# Patient Record
Sex: MALE | Race: WHITE | NOT HISPANIC OR LATINO | Employment: FULL TIME | URBAN - METROPOLITAN AREA
[De-identification: names, ages, dates, MRNs, and addresses within clinical notes are randomized per-mention and may not be internally consistent; named-entity substitution may affect disease eponyms.]

---

## 2018-04-05 ENCOUNTER — TRANSCRIBE ORDERS (OUTPATIENT)
Dept: ADMINISTRATIVE | Facility: HOSPITAL | Age: 63
End: 2018-04-05

## 2018-04-05 DIAGNOSIS — R91.1 LUNG NODULE: Primary | ICD-10-CM

## 2019-02-14 ENCOUNTER — OFFICE VISIT (OUTPATIENT)
Dept: PULMONOLOGY | Facility: CLINIC | Age: 64
End: 2019-02-14

## 2019-02-14 VITALS
TEMPERATURE: 97.8 F | OXYGEN SATURATION: 97 % | HEART RATE: 82 BPM | WEIGHT: 226 LBS | DIASTOLIC BLOOD PRESSURE: 80 MMHG | SYSTOLIC BLOOD PRESSURE: 140 MMHG | BODY MASS INDEX: 26.68 KG/M2 | HEIGHT: 77 IN

## 2019-02-14 DIAGNOSIS — R93.89 ABNORMAL CHEST X-RAY: Primary | ICD-10-CM

## 2019-02-14 DIAGNOSIS — J30.9 ALLERGIC RHINITIS, UNSPECIFIED SEASONALITY, UNSPECIFIED TRIGGER: ICD-10-CM

## 2019-02-14 PROCEDURE — 94729 DIFFUSING CAPACITY: CPT | Performed by: INTERNAL MEDICINE

## 2019-02-14 PROCEDURE — 99204 OFFICE O/P NEW MOD 45 MIN: CPT | Performed by: INTERNAL MEDICINE

## 2019-02-14 PROCEDURE — 94726 PLETHYSMOGRAPHY LUNG VOLUMES: CPT | Performed by: INTERNAL MEDICINE

## 2019-02-14 PROCEDURE — 94375 RESPIRATORY FLOW VOLUME LOOP: CPT | Performed by: INTERNAL MEDICINE

## 2019-02-14 RX ORDER — ATORVASTATIN CALCIUM 20 MG/1
20 TABLET, FILM COATED ORAL DAILY
COMMUNITY

## 2019-02-14 RX ORDER — ACETAMINOPHEN 500 MG
500 TABLET ORAL EVERY 6 HOURS PRN
COMMUNITY

## 2019-02-14 RX ORDER — ALBUTEROL SULFATE 90 UG/1
4 AEROSOL, METERED RESPIRATORY (INHALATION) ONCE
Status: COMPLETED | OUTPATIENT
Start: 2019-02-14 | End: 2019-02-14

## 2019-02-14 RX ORDER — FAMOTIDINE 10 MG
10 TABLET ORAL 2 TIMES DAILY
COMMUNITY

## 2019-02-14 RX ADMIN — ALBUTEROL SULFATE 4 PUFF: 90 AEROSOL, METERED RESPIRATORY (INHALATION) at 15:16

## 2019-02-15 NOTE — PROGRESS NOTES
"CC:    Possible emphysema    HPI:    62 y/o WM who occasionally smokes cigars, o/w healthy referred for evaluation of possible emphysema.  He had a screening CXR that was abnormal for biceps tendon repair which was followed by CT scan.  This showed some nodules c/w scarring primarily in the apices.  The first scan was 1/2016.  He had a repeat in 8/2016 that was stable.  His CXR shows hyperinflation and flattened diaphragms and has been read as \"severe emphysema\".  Note patient is about 6'6\" tall. Until recently he was exercising regularly - 30 minutes of moderate intensity cardio followed by weight training several times per week.  No shortness of breath, chest tightness, wheezing, etc.  He does have a dry cough and allergic rhinitis.  Moved her from [place] about 3.5 years ago.      PMH:    History reviewed. No pertinent past medical history.  PSH:    Past Surgical History:   Procedure Laterality Date   • BICEPS TENDON REPAIR       FH:    Family History   Problem Relation Age of Onset   • Cancer Sister         breast   • Hypertension Brother    • Hyperlipidemia Brother      SH:    Social History     Socioeconomic History   • Marital status:      Spouse name: Not on file   • Number of children: Not on file   • Years of education: Not on file   • Highest education level: Not on file   Social Needs   • Financial resource strain: Not on file   • Food insecurity - worry: Not on file   • Food insecurity - inability: Not on file   • Transportation needs - medical: Not on file   • Transportation needs - non-medical: Not on file   Occupational History   • Not on file   Tobacco Use   • Smoking status: Current Some Day Smoker     Years: 8.00     Types: Cigars   Substance and Sexual Activity   • Alcohol use: Yes     Comment: 4-5 drinks/week   • Drug use: No   • Sexual activity: Defer   Other Topics Concern   • Not on file   Social History Narrative   • Not on file     ALLERGIES:    No Known " Allergies  MEDICATIONS:      Current Outpatient Medications:   •  acetaminophen (TYLENOL) 500 MG tablet, Take 500 mg by mouth Every 6 (Six) Hours As Needed for Mild Pain ., Disp: , Rfl:   •  atorvastatin (LIPITOR) 20 MG tablet, Take 20 mg by mouth Daily. Once every three days, Disp: , Rfl:   •  famotidine (PEPCID) 10 MG tablet, Take 10 mg by mouth 2 (Two) Times a Day., Disp: , Rfl:   •  Pseudoephedrine-DM-GG (SUDAFED COUGH PO), Take 120 mg by mouth., Disp: , Rfl:   No current facility-administered medications for this visit.   ROS:  Per HPI, otherwise all systems reviewed and negative.    DIAGNOSTIC DATA (Reviewed and interpreted by me unless otherwise specified):    CT scans 1/2016 & 8/2016 - stable bi-apical predominant scarring, normal lung parenchyma    CXR 2/15/19 - hyperinflation and flattened diaphragms    PFT 2/15/19 - Normal    Vitals:    02/14/19 1529   BP: 140/80   Pulse: 82   Temp: 97.8 °F (36.6 °C)   SpO2: 97%       Physical Exam   Constitutional: Oriented to person, place, and time. Appears well-developed and well-nourished.   Head: Normocephalic and atraumatic.   Nose: Nose normal.   Mouth/Throat: Oropharynx is clear and moist.   Eyes: Conjunctivae are normal.  Pupils normal.  Neck: No tracheal deviation present.   Cardiovascular: Normal rate, regular rhythm, normal heart sounds and intact distal pulses.  Exam reveals no gallop and no friction rub.  No thrill.  No JVD.  No edema.  No murmur heard.  Pulmonary/Chest: Effort normal and breath sounds normal.  No tenderness to palpation.  No clubbing.   Abdominal: Soft. Bowel sounds are normal. No distension. No tenderness. There is no guarding.   Musculoskeletal: Normal range of motion.  No tenderness.  Lymphadenopathy:  No cervical adenopathy.   Neurological:  No new focal neurological deficits observed   Skin: Skin is warm and dry. No rash noted.   Psychiatric: Normal mood and affect.  Behavior is normal. Judgment normal.    Assessment/Plan     1)   Allergic Rhinitis - recommend flonase and anti-histamine     2)  Abnormal Imaging / Possible Emphysema - I think the CXR findings are a normal variant which is common in tall, thin patients.  No evidence of COPD or emphysema on PFTs or clinically.  No follow up imaging needed.    RTC prkari Gonsalves MD  Pulmonology and Critical Care Medicine  02/15/19 10:37 AM  Electronically Signed    C.C.:  No ref. provider found, Tony Davis, APRN